# Patient Record
Sex: FEMALE | Race: WHITE | NOT HISPANIC OR LATINO | Employment: FULL TIME | URBAN - METROPOLITAN AREA
[De-identification: names, ages, dates, MRNs, and addresses within clinical notes are randomized per-mention and may not be internally consistent; named-entity substitution may affect disease eponyms.]

---

## 2017-02-17 ENCOUNTER — OFFICE VISIT (OUTPATIENT)
Dept: MIDWIFE SERVICES | Facility: CLINIC | Age: 22
End: 2017-02-17
Payer: COMMERCIAL

## 2017-02-17 ENCOUNTER — TELEPHONE (OUTPATIENT)
Dept: MIDWIFE SERVICES | Facility: CLINIC | Age: 22
End: 2017-02-17

## 2017-02-17 VITALS
HEIGHT: 64 IN | BODY MASS INDEX: 22.2 KG/M2 | WEIGHT: 130 LBS | SYSTOLIC BLOOD PRESSURE: 120 MMHG | DIASTOLIC BLOOD PRESSURE: 72 MMHG | HEART RATE: 80 BPM

## 2017-02-17 DIAGNOSIS — N92.6 MISSED PERIOD: Primary | ICD-10-CM

## 2017-02-17 DIAGNOSIS — Z30.430 VISIT FOR INSERTION OF INTRAUTERINE DEVICE: Primary | ICD-10-CM

## 2017-02-17 DIAGNOSIS — Z11.8 SCREENING FOR CHLAMYDIAL DISEASE: ICD-10-CM

## 2017-02-17 DIAGNOSIS — Z23 NEED FOR HPV VACCINE: ICD-10-CM

## 2017-02-17 DIAGNOSIS — Z11.3 SCREENING EXAMINATION FOR VENEREAL DISEASE: ICD-10-CM

## 2017-02-17 DIAGNOSIS — Z30.09 COUNSELING FOR BIRTH CONTROL REGARDING INTRAUTERINE DEVICE (IUD): ICD-10-CM

## 2017-02-17 LAB — B-HCG SERPL-ACNC: <1 IU/L

## 2017-02-17 PROCEDURE — 87491 CHLMYD TRACH DNA AMP PROBE: CPT | Performed by: ADVANCED PRACTICE MIDWIFE

## 2017-02-17 PROCEDURE — 84702 CHORIONIC GONADOTROPIN TEST: CPT | Performed by: ADVANCED PRACTICE MIDWIFE

## 2017-02-17 PROCEDURE — 36415 COLL VENOUS BLD VENIPUNCTURE: CPT | Performed by: ADVANCED PRACTICE MIDWIFE

## 2017-02-17 PROCEDURE — 90471 IMMUNIZATION ADMIN: CPT | Performed by: ADVANCED PRACTICE MIDWIFE

## 2017-02-17 PROCEDURE — 87591 N.GONORRHOEAE DNA AMP PROB: CPT | Performed by: ADVANCED PRACTICE MIDWIFE

## 2017-02-17 PROCEDURE — 90651 9VHPV VACCINE 2/3 DOSE IM: CPT | Performed by: ADVANCED PRACTICE MIDWIFE

## 2017-02-17 PROCEDURE — 58300 INSERT INTRAUTERINE DEVICE: CPT | Performed by: ADVANCED PRACTICE MIDWIFE

## 2017-02-17 RX ORDER — MISOPROSTOL 200 UG/1
200 TABLET ORAL EVERY 12 HOURS
Qty: 2 TABLET | Refills: 0 | Status: SHIPPED | OUTPATIENT
Start: 2017-02-17 | End: 2017-02-21

## 2017-02-17 ASSESSMENT — ANXIETY QUESTIONNAIRES
7. FEELING AFRAID AS IF SOMETHING AWFUL MIGHT HAPPEN: NOT AT ALL
2. NOT BEING ABLE TO STOP OR CONTROL WORRYING: SEVERAL DAYS
GAD7 TOTAL SCORE: 4
6. BECOMING EASILY ANNOYED OR IRRITABLE: SEVERAL DAYS
IF YOU CHECKED OFF ANY PROBLEMS ON THIS QUESTIONNAIRE, HOW DIFFICULT HAVE THESE PROBLEMS MADE IT FOR YOU TO DO YOUR WORK, TAKE CARE OF THINGS AT HOME, OR GET ALONG WITH OTHER PEOPLE: SOMEWHAT DIFFICULT
5. BEING SO RESTLESS THAT IT IS HARD TO SIT STILL: NOT AT ALL
1. FEELING NERVOUS, ANXIOUS, OR ON EDGE: SEVERAL DAYS
3. WORRYING TOO MUCH ABOUT DIFFERENT THINGS: SEVERAL DAYS

## 2017-02-17 ASSESSMENT — PATIENT HEALTH QUESTIONNAIRE - PHQ9: 5. POOR APPETITE OR OVEREATING: NOT AT ALL

## 2017-02-17 NOTE — PROGRESS NOTES
"    SUBJECTIVE:                                                   Analy Hastings is a 21 year old female who presents to clinic today for the following health issue(s):  No chief complaint on file.      Additional information: ***    HPI:  ***    No LMP recorded..   Patient {is/is not:501279::\"is\"} sexually active, .  Using {[facility] CONTRACEPTION:886758} for contraception.    reports that she has never smoked. She has never used smokeless tobacco.  {Tobacco Cessation -- Delete if patient is a non-smoker:637909}  STD testing offered?  {[facility] GC/CHLAMYDIA:966636}    Health maintenance updated:  {yes no:063554}    Today's PHQ-2 Score:   PHQ-2 (  [employer]) 2015   Q1: Little interest or pleasure in doing things 0   Q2: Feeling down, depressed or hopeless 0   PHQ-2 Score 0     Today's PHQ-9 Score: No flowsheet data found.  Today's ARLEN-7 Score: No flowsheet data found.    Problem list and histories reviewed & adjusted, as indicated.  Additional history: as documented.    Patient Active Problem List   Diagnosis     CARDIOVASCULAR SCREENING; LDL GOAL LESS THAN 160     Past Surgical History   Procedure Laterality Date     Hc tooth extraction w/forcep        Social History   Substance Use Topics     Smoking status: Never Smoker     Smokeless tobacco: Never Used     Alcohol use 0.0 oz/week     0 Standard drinks or equivalent per week      Comment: socially      Problem (# of Occurrences) Relation (Name,Age of Onset)    HEART DISEASE (1) Maternal Grandfather    Rheumatoid Arthritis (1) Mother            Current Outpatient Prescriptions   Medication Sig     levonorgestrel-ethinyl estradiol (AVIANE,ALESSE,LESSINA) 0.1-20 MG-MCG per tablet Take 1 tablet by mouth daily     clindamycin (CLINDAMAX) 1 % gel Apply topically every morning     tretinoin (RETIN-A) 0.025 % cream Apply topically At Bedtime     norgestim-eth estrad triphasic (TRI-SPRINTEC) 0.18/0.215/0.25 MG-35 MCG per tablet Take 1 tablet by mouth daily     No " "current facility-administered medications for this visit.      No Known Allergies    ROS:  {[facility] ROSGYN:017311::\"12 point review of systems negative other than symptoms noted below.\"}    OBJECTIVE:     There were no vitals taken for this visit.  There is no height or weight on file to calculate BMI.    Exam:  {[facility] EXAM CHOICES:746703}     In-Clinic Test Results:  No results found for this or any previous visit (from the past 24 hour(s)).    ASSESSMENT/PLAN:                                                      No diagnosis found.    There are no Patient Instructions on file for this visit.    ***    [facility]  "

## 2017-02-17 NOTE — PROGRESS NOTES
"  SUBJECTIVE:                                                   Analy Hastings is a 21 year old who presents to clinic today for the following health issue(s):  Patient presents with:  IUD: Discuss, using condoms, 6 days late on period  * 2 UPT's both negative    HPI: Analy presents today for evaluation of a missed period.  .  She states that her periods are usually every 28-30 days.  Her LMP was 17. She had some \"light bleeding\" around  and has had no bleeding since.  She expected her period to start on or around 17.  Analy states that she is stressed out from her role as a  but denies any sudden increases in stress/anxiety.  She has not had any major changes in her diet.  Analy did join a gym in January and has been working out 4x per week; she was not working out previously. Analy is sexually active, no new partners, and she uses condoms each time she has intercourse.  She is not concerned about STIs.  Analy took two pregnancy tests at home on Wednesday 2/15/17 and both were negative.  Denies any pregnancy symptoms; states she did have worsening of her acne this week, which usually happens before her period. Would like to have a serum HCG drawn.    Analy desires to have an IUD placed if her pregnancy test today is negative.  She had previously been on OCPs since jatinder high but was having trouble remembering to take them, so she discontinued them a few months ago.  Risks, benefits and alternatives of IUDs discussed at length, including pregnancy, ectopic pregnancy, PID, life threatening infection, perforation, expulsion, cramping, changes in bleeding and ovarian cysts.  The probable mechanisms of action were covered, failure rates, spontaneous expulsion, the importance of checking the string monthly, as well as minor or nuisance side effects such as ovarian cysts, migraines, skin changes, irregular and unpredictable bleeding in the first 6 months of " "use and bleeding patterns after the first 6 months. Analy denies having any distortion of the uterine cavity, active pelvic infections, unexplained uterine bleeding, breast cancer, liver disease.         Patient's last menstrual period was 2017.  Menstrual History: frequency: every 28-30 days  Patient is sexually active  .  Using condoms for contraception.   Health maintenance updated:  yes  STI infx testing offered:  Declined    Last PHQ-9 score on record =   PHQ-9 SCORE 2017   Total Score 2     Last GAD7 score on record =   ARLEN-7 SCORE 2017   Total Score 4     Alcohol Score = 4    Problem list and histories reviewed & adjusted, as indicated.  Additional history: as documented.    Patient Active Problem List   Diagnosis     CARDIOVASCULAR SCREENING; LDL GOAL LESS THAN 160     Past Surgical History   Procedure Laterality Date     Hc tooth extraction w/forcep        Social History   Substance Use Topics     Smoking status: Never Smoker     Smokeless tobacco: Never Used     Alcohol use 0.0 oz/week     0 Standard drinks or equivalent per week      Comment: socially      Problem (# of Occurrences) Relation (Name,Age of Onset)    HEART DISEASE (1) Maternal Grandfather    Rheumatoid Arthritis (1) Mother            Current Outpatient Prescriptions   Medication Sig     clindamycin (CLINDAMAX) 1 % gel Apply topically every morning     tretinoin (RETIN-A) 0.025 % cream Apply topically At Bedtime     No current facility-administered medications for this visit.      No Known Allergies    ROS:  12 point review of systems negative other than symptoms noted below.  Genitourinary: No Periods  Skin: Acne    OBJECTIVE:     /72  Pulse 80  Ht 5' 4\" (1.626 m)  Wt 130 lb (59 kg)  LMP 2017  Breastfeeding? No  BMI 22.31 kg/m2  Body mass index is 22.31 kg/(m^2).    PHYSICAL EXAM:  Constitutional:  Appearance: Well nourished, well developed alert, in no acute distress  Chest:  Respiratory Effort:  " Breathing unlabored  Neurologic/Psychiatric:  Mental Status:  Oriented X3   Cervix: no lesions, no discharge.  GC/CT collected. Negative cervical motion tenderness.  Ovaries: no masses appreciated  Uterus: retroverted, smooth contour, without enlargement, mobile and without tenderness  Rectal:  deferred      In-Clinic Test Results:  No results found for this or any previous visit (from the past 24 hour(s)).    ASSESSMENT/PLAN:                                                        ICD-10-CM    1. Missed period N92.6 HCG quantitative pregnancy   2. Counseling for birth control regarding intrauterine device (IUD) Z30.09    3. Screening examination for venereal disease Z11.3 Neisseria gonorrhoeae PCR   4. Screening for chlamydial disease Z11.8 Chlamydia trachomatis PCR   5. Need for HPV vaccine Z23 HUMAN PAPILLOMA VIRUS (GARDASIL 9) VACCINE     ADMIN 1st VACCINE       COUNSELING:  -HCG quant, GC/CT ordered  -Third dose of Gardasil vaccine administered  -Will call with HCG quant, GC/CT results as they become available  -Pamphlets for Mirena, Jacqueline and Paragard provided to Analy  -May schedule an IUD insertion if HCG quant is negative; repeat pregnancy test day of placement. Consider cytotec administration prior to placement.        Mihaela Soler, DNP, APRN, CNM    40 minutes was spent face to face with the patient today discussing her history, diagnosis, and follow-up plan as noted above. Over 50% of the visit was spent in counseling and coordination of care.    Total Visit Time: 40 minutes.

## 2017-02-17 NOTE — PROGRESS NOTES
I spoke with Analy via telephone call and communicated these results.  See telephone encounter.  Analy voiced understanding and denied further questions or concerns.    Mihaela Soler, DNP, APRN, CNM

## 2017-02-17 NOTE — TELEPHONE ENCOUNTER
Call placed to Analy regarding her lab results.  HCG quant result is <1.  She desires to have a Mirena placed.  Because she is unsure when her period will come next, she would like to schedule IUD placement for next week and use misoprostol rather than wait for her period.  Rx sent to preferred pharmacy.  Advised Analy to abstain from intercourse until after placement. Discussed seeking care if she experiences heavy bleeding or severe pain with misoprostol use.  Analy is comfortable with the plan of care and denies further questions or concerns. Transferred to scheduling to make IUD insertion appointment.     Mihaela Soler, DNP, APRN, CNM

## 2017-02-17 NOTE — MR AVS SNAPSHOT
"              After Visit Summary   2/17/2017    Analy Hastings    MRN: [id number]           Patient Information     Date Of Birth          1995        Visit Information        Provider Department      2/17/2017 11:00 AM Mihaela Soler APRN CNM [facility]        Today's Diagnoses     Missed period    -  1    Screening examination for venereal disease        Screening for chlamydial disease           Follow-ups after your visit        Who to contact     If you have questions or need follow up information about today's clinic visit or your schedule please contact [facility] directly at [phone].  Normal or non-critical lab and imaging results will be communicated to you by [employer], letter or phone within 4 business days after the clinic has received the results. If you do not hear from us within 7 days, please contact the clinic through [employer] or phone. If you have a critical or abnormal lab result, we will notify you by phone as soon as possible.  Submit refill requests through [employer] or call your pharmacy and they will forward the refill request to us. Please allow 3 business days for your refill to be completed.          Additional Information About Your Visit        MyChart Information     [employer] gives you secure access to your electronic health record. If you see a primary care provider, you can also send messages to your care team and make appointments. If you have questions, please call your primary care clinic.  If you do not have a primary care provider, please call [phone] and they will assist you.        Care EveryWhere ID     This is your Care EveryWhere ID. This could be used by other organizations to access your [place] medical records  [id number]        Your Vitals Were     Pulse Height Last Period Breastfeeding? BMI (Body Mass Index)       80 5' 4\" (1.626 m) 01/13/2017 No 22.31 kg/m2        Blood Pressure from Last 3 Encounters: "   02/17/17 120/72   08/24/16 116/58   09/23/15 118/70    Weight from Last 3 Encounters:   02/17/17 130 lb (59 kg)   08/24/16 130 lb 3.2 oz (59.1 kg)   09/23/15 121 lb 4.8 oz (55 kg)              We Performed the Following     Chlamydia trachomatis PCR     HCG quantitative pregnancy     Neisseria gonorrhoeae PCR        Primary Care Provider    None Specified       No primary provider on file.        Thank you!     Thank you for choosing [facility] [place]  for your care. Our goal is always to provide you with excellent care. Hearing back from our patients is one way we can continue to improve our services. Please take a few minutes to complete the written survey that you may receive in the mail after your visit with us. Thank you!             Your Updated Medication List - Protect others around you: Learn how to safely use, store and throw away your medicines at www.disposemymeds.org.          This list is accurate as of: 2/17/17 12:09 PM.  Always use your most recent med list.                   Brand Name Dispense Instructions for use    clindamycin 1 % topical gel    CLINDAMAX    30 g    Apply topically every morning       tretinoin 0.025 % cream    RETIN-A    20 g    Apply topically At Bedtime

## 2017-02-18 ASSESSMENT — ANXIETY QUESTIONNAIRES: GAD7 TOTAL SCORE: 4

## 2017-02-18 ASSESSMENT — PATIENT HEALTH QUESTIONNAIRE - PHQ9: SUM OF ALL RESPONSES TO PHQ QUESTIONS 1-9: 2

## 2017-02-19 LAB
C TRACH DNA SPEC QL NAA+PROBE: NORMAL
N GONORRHOEA DNA SPEC QL NAA+PROBE: NORMAL
SPECIMEN SOURCE: NORMAL
SPECIMEN SOURCE: NORMAL

## 2017-02-21 ENCOUNTER — OFFICE VISIT (OUTPATIENT)
Dept: MIDWIFE SERVICES | Facility: CLINIC | Age: 22
End: 2017-02-21
Payer: COMMERCIAL

## 2017-02-21 VITALS
WEIGHT: 134 LBS | DIASTOLIC BLOOD PRESSURE: 76 MMHG | SYSTOLIC BLOOD PRESSURE: 118 MMHG | BODY MASS INDEX: 22.88 KG/M2 | HEIGHT: 64 IN | HEART RATE: 76 BPM

## 2017-02-21 DIAGNOSIS — Z30.430 ENCOUNTER FOR INTRAUTERINE DEVICE PLACEMENT: Primary | ICD-10-CM

## 2017-02-21 PROCEDURE — 58300 INSERT INTRAUTERINE DEVICE: CPT | Performed by: ADVANCED PRACTICE MIDWIFE

## 2017-02-21 NOTE — MR AVS SNAPSHOT
After Visit Summary   2/21/2017    Analy Hastings    MRN: [id number]           Patient Information     Date Of Birth          1995        Visit Information        Provider Department      2/21/2017 2:30 PM Mihaela Soler APRN CNM [facility]        Today's Diagnoses     Encounter for intrauterine device placement    -  1      Care Instructions    Your Intrauterine Device (IUD)     What to watch for right after IUD placement:      Some women may experience uterine cramps, bleeding, and/or dizziness during and right after IUD placement       To help minimize the cramps, you may take ibuprofen 600 mg with food prior to your appointment. These symptoms should improve over the next 24 hours.  Mild cramping may be present for a few days after IUD placement. You may continue taking ibuprofen as directed if needed      You may experience spotting or bleeding for the first few weeks after IUD placement      Use condoms or abstain from sex for 7 days after the insertion of your Mirena or Jacqueline IUD      If you experience fever, abdominal pain, worsening pelvic pain, dizziness, unusually heavy vaginal bleeding, suspected expulsion of device or four smelling vaginal discharge please come to the clinic for evaluation      Please schedule an appointment at the clinic for a string check 4-6 weeks after IUD placement    Your periods may change (Jacqueline/Mirena):      For the first 3 to 6 months, your monthly period may become irregular. You may also have frequent spotting or light bleeding. A few women have heavy bleeding during this time. After your body adjusts, the number of bleeding days is likely to decrease (but may remain irregular), and you may even find that your periods stop altogether for as long as Jacqueline/Mirena is in place.  Your periods will return rapidly once the IUD is removed.      ParaGard IUD users:      ParaGard IUD users may experience heavier than normal cycles  while their IUD is in place, this is considered normal     Back-up contraception is not needed                Checking for your strings:      We encourage everyone with an IUD in place to check for their strings monthly      You may check your own IUD strings by inserting a finger into the vagina and feeling the strings as they exit the cervix.  The strings will initially feel firm, like fishing line, but will soften over a few weeks.  After the strings have softened, you or your partner should not be able to feel the strings during intercourse.       If the string length greatly changes or if you cannot feel your strings at all please make an appointment to see you midwife and use a backup method of contraception like a condom.      If you can feel something hard/plastic like the IUD may not be in the correct place. You should then see your healthcare provider to have the position confirmed with ultrasound.       Remember:    IUD's do not protect against HIV or STIs.  IUD's do not prevent the formation of ovarian cysts.  IUD's do not typically reduce acne or cause weight gain or mood changes.    For more information:  [email or web address]      If you have questions or concerns please call:    [facility]   [phone]          Follow-ups after your visit        Your next 10 appointments already scheduled     Apr 04, 2017  9:00 AM CDT   Office Visit with STAR Dove CNM   [facility] ([facility])    [address] [place] [id number] [phone]           Bring a current list of meds and any records pertaining to this visit.  For Physicals, please bring immunization records and any forms needing to be filled out.  Please arrive 10 minutes early to complete paperwork.              Who to contact     If you have questions or need follow up information about today's clinic visit or your schedule please contact [place]  [facility] [facility] [place] directly at [phone].  Normal or non-critical lab and imaging results will be communicated to you by MyChart, letter or phone within 4 business days after the clinic has received the results. If you do not hear from us within 7 days, please contact the clinic through [email or web address] or phone. If you have a critical or abnormal lab result, we will notify you by phone as soon as possible.  Submit refill requests through [employer] or call your pharmacy and they will forward the refill request to us. Please allow 3 business days for your refill to be completed.          Additional Information About Your Visit        Shippable[employer]Reissued Information     [employer] gives you secure access to your electronic health record. If you see a primary care provider, you can also send messages to your care team and make appointments. If you have questions, please call your primary care clinic.  If you do not have a primary care provider, please call [phone] and they will assist you.        Care EveryWhere ID     This is your Care EveryWhere ID. This could be used by other organizations to access your [place] medical records  [id number]        Your Vitals Were     Pulse Height Last Period BMI (Body Mass Index)          76 5' 4\" (1.626 m) 01/13/2017 23 kg/m2         Blood Pressure from Last 3 Encounters:   02/21/17 118/76   02/17/17 120/72   08/24/16 116/58    Weight from Last 3 Encounters:   02/21/17 134 lb (60.8 kg)   02/17/17 130 lb (59 kg)   08/24/16 130 lb 3.2 oz (59.1 kg)              We Performed the Following     HC LEVONORGESTREL IU 52MG 5 YR     INSERTION INTRAUTERINE DEVICE          Today's Medication Changes          These changes are accurate as of: 2/21/17  3:06 PM.  If you have any questions, ask your nurse or doctor.               Start taking these medicines.        Dose/Directions    levonorgestrel 20 MCG/24HR IUD   Commonly known as:  MIRENA (52 MG)   Used for:  Encounter for intrauterine device placement "   Started by:  Mihaela Soler APRN CNM        Dose:  1 each   1 each (20 mcg) by Intrauterine route once for 1 dose   Quantity:  1 each   Refills:  0            Where to get your medicines      Some of these will need a paper prescription and others can be bought over the counter.  Ask your nurse if you have questions.     You don't need a prescription for these medications     levonorgestrel 20 MCG/24HR IUD                Primary Care Provider    None Specified       No primary provider on file.        Thank you!     Thank you for choosing [facility] [place]  for your care. Our goal is always to provide you with excellent care. Hearing back from our patients is one way we can continue to improve our services. Please take a few minutes to complete the written survey that you may receive in the mail after your visit with us. Thank you!             Your Updated Medication List - Protect others around you: Learn how to safely use, store and throw away your medicines at www.disposemymeds.org.          This list is accurate as of: 2/21/17  3:06 PM.  Always use your most recent med list.                   Brand Name Dispense Instructions for use    clindamycin 1 % topical gel    CLINDAMAX    30 g    Apply topically every morning       levonorgestrel 20 MCG/24HR IUD    MIRENA (52 MG)    1 each    1 each (20 mcg) by Intrauterine route once for 1 dose       tretinoin 0.025 % cream    RETIN-A    20 g    Apply topically At Bedtime

## 2017-02-21 NOTE — PROGRESS NOTES
MIDWIFE IUD PLACEMENT NOTE    HPI:   Analy Hastings is a 21 year old female here today for IUD insertion.  Patient's last menstrual period was 01/13/2017.  Quant HCG on 2/17/17 - Negative  Patient has not premedicated with Ibuprofen   Patient did use Cytotec prior to IUD insertion  Patient does not have any infections or cervicitis. Gonorrhea and Chlamydia testing collected 2/17/17; negative.  Patient does not have history of liver problems or cancer.     Patient has been given written information.  I have reviewed the risks of the IUD including pregnancy, PID, life threatening infection, perforation, expulsion, cramping, changes in bleeding and ovarian cysts. Benefits of the IUD and alternative family planning methods have been discussed.  The probable mechanisms of action were covered, failure rates, spontaneous expulsion, the importance of checking the string monthly, as well as minor or nuisance side effects such as ovarian cysts, migraines, skin changes irregular and unpredictable bleeding in the first 6 months of use and bleeding patterns after the first 6 months.  The 's printed material was provided for her review.  Patients questions are answered.  Patient has verbalized understanding of risks and benefits and has signed the consent form.    Health maintenance updated:  yes    No Known Allergies  Current Outpatient Prescriptions   Medication Sig Dispense Refill     levonorgestrel (MIRENA, 52 MG,) 20 MCG/24HR IUD 1 each (20 mcg) by Intrauterine route once for 1 dose 1 each 0     clindamycin (CLINDAMAX) 1 % gel Apply topically every morning 30 g 11     tretinoin (RETIN-A) 0.025 % cream Apply topically At Bedtime 20 g 3      Past Medical History   Diagnosis Date     Migraine      Family History   Problem Relation Age of Onset     Rheumatoid Arthritis Mother      HEART DISEASE Maternal Grandfather      Social History     Social History     Marital status: Single     Spouse name: N/A     Number of  "children: 0     Years of education: N/A     Occupational History      Student     elementary education     Social History Main Topics     Smoking status: Never Smoker     Smokeless tobacco: Never Used     Alcohol use 0.0 oz/week     0 Standard drinks or equivalent per week      Comment: socially     Drug use: No     Sexual activity: Yes     Partners: Male     Birth control/ protection: None     Other Topics Concern     Not on file     Social History Narrative     Past Surgical History   Procedure Laterality Date     Hc tooth extraction w/forcep         REVIEW OF SYSTEMS:  12 point review of systems negative other than symptoms noted below.      EXAM:  /76  Pulse 76  Ht 5' 4\" (1.626 m)  Wt 134 lb (60.8 kg)  LMP 01/13/2017  BMI 23 kg/m2    PELVIC EXAM:  Vulva: No external lesions, BUS WNL  Vagina: Moist, pink, discharge normal  well rugated, no lesions   Cervix: smooth, pink, no visible lesions, neg CMT  Uterus: Normal size, mid-position, non-tender, mobile  Ovaries: No mass, non-tender  Rectal exam: deferred    IUD type: Mirena       ASSESSMENT/ PLAN:  Encounter for intrauterine device placement    Procedure:  Uterus assessed for position and is mid-position.  Speculum inserted.  Betadine prep of cervix done.  Tenaculum applied at 10 and 2 o'clock.  Uterine sounded to 6.25cm's.  Cervical dilators not used.   IUD inserted in the usual fashion without difficulty.  Tenaculum removed with scant bleeding from the cervix.  Strings trimmed to 2.5 cm's.  Patient tolerated the procedure well.       COUNSELING:  Verbal and written instructions given to patient regarding checking IUD strings, warning signs.    Reviewed warning signs of fever, sharp/severe abdominal pain, reassured it can be normal to have menstrual like cramping after placement and spotting/light bleeding may last a few weeks after placement.    Reviewed with patient that the IUD needs to be replaced in 5 years, nothing in vagina for 7 days (including " intercourse) following placement of Mirena or Jacqueline IUD's.    Reviewed that IUD does not protect against STI.    Follow up appointment in 4 to 12 weeks for IUD/string check.      Mihaela Soler, DESTINEE, APRN, CNM

## 2017-02-21 NOTE — PATIENT INSTRUCTIONS
Your Intrauterine Device (IUD)     What to watch for right after IUD placement:      Some women may experience uterine cramps, bleeding, and/or dizziness during and right after IUD placement       To help minimize the cramps, you may take ibuprofen 600 mg with food prior to your appointment. These symptoms should improve over the next 24 hours.  Mild cramping may be present for a few days after IUD placement. You may continue taking ibuprofen as directed if needed      You may experience spotting or bleeding for the first few weeks after IUD placement      Use condoms or abstain from sex for 7 days after the insertion of your Mirena or Jacqueline IUD      If you experience fever, abdominal pain, worsening pelvic pain, dizziness, unusually heavy vaginal bleeding, suspected expulsion of device or four smelling vaginal discharge please come to the clinic for evaluation      Please schedule an appointment at the clinic for a string check 4-6 weeks after IUD placement    Your periods may change (Jacqueline/Mirena):      For the first 3 to 6 months, your monthly period may become irregular. You may also have frequent spotting or light bleeding. A few women have heavy bleeding during this time. After your body adjusts, the number of bleeding days is likely to decrease (but may remain irregular), and you may even find that your periods stop altogether for as long as Jacqueline/Mirena is in place.  Your periods will return rapidly once the IUD is removed.      ParaGard IUD users:      ParaGard IUD users may experience heavier than normal cycles while their IUD is in place, this is considered normal     Back-up contraception is not needed                Checking for your strings:      We encourage everyone with an IUD in place to check for their strings monthly      You may check your own IUD strings by inserting a finger into the vagina and feeling the strings as they exit the cervix.  The strings will initially feel firm, like fishing  line, but will soften over a few weeks.  After the strings have softened, you or your partner should not be able to feel the strings during intercourse.       If the string length greatly changes or if you cannot feel your strings at all please make an appointment to see you midwife and use a backup method of contraception like a condom.      If you can feel something hard/plastic like the IUD may not be in the correct place. You should then see your healthcare provider to have the position confirmed with ultrasound.       Remember:    IUD's do not protect against HIV or STIs.  IUD's do not prevent the formation of ovarian cysts.  IUD's do not typically reduce acne or cause weight gain or mood changes.    For more information:  [email or web address]/      If you have questions or concerns please call:    [facility]   [phone]

## 2017-03-01 ENCOUNTER — MYC MEDICAL ADVICE (OUTPATIENT)
Dept: MIDWIFE SERVICES | Facility: CLINIC | Age: 22
End: 2017-03-01

## 2017-03-02 ENCOUNTER — TELEPHONE (OUTPATIENT)
Dept: NURSING | Facility: CLINIC | Age: 22
End: 2017-03-02

## 2017-03-02 NOTE — TELEPHONE ENCOUNTER
LMTCB (PHI on consent) to discuss as we prefer to talk on the phone versus MyChart about bleeding.

## 2017-03-02 NOTE — TELEPHONE ENCOUNTER
"Call Type: Triage Call    Presenting Problem: Had an IUD placed on the 22nd.  Had menstrual  period for about 4 days. Continue to bleed although it is getting  less each day. It is now 8 days. She is wearing a panty liner and  her flow is described now as 1/2 of what it was 2 days ago - fairly  light but more than just \"spotting\".  It is getting less each day.  She has no fever and does not feel ill.  She states she had a  negative pregnancy test prior to insertion.  She will call the  clinic tomorrow to talk to a nurse who will have access to the MD's  and together will decide if she needs to be seen or if she should  get through the weekend and see if she is still bleeding Monday.  Triage Note:  Guideline Title: Contraception: Intrauterine Device (IUD)  Recommended Disposition: See Provider within 72 Hours  Original Inclination: Wanted to speak with a nurse  Override Disposition:  Intended Action: Follow advice given  Physician Contacted: No  Mild vaginal bleeding or spotting ?  YES  Moderate vaginal bleeding ? NO  Sexually active AND ectopic pregnancy risk factors ? NO  Able to feel hard plastic of IUD (not the string) ? NO  New or worsening signs and symptoms that may indicate shock ? NO  Moderate abdominal or pelvic pain ? NO  Unbearable abdominal/pelvic pain or cramping ? NO  Profuse vaginal bleeding not contained by pads or tampons ? NO  Heavy vaginal bleeding (soaking 1 pad/tampon every hour for 2 hours or more) ? NO  Pelvic pain AND foul smelling or unusual vaginal discharge ? NO  Foul smelling or unusual vaginal discharge with no other symptoms ? NO  Pregnant AND IUD related symptoms ? NO  IUD inserted within last 48 hours AND having persistent pain when following  provider pain relief instructions ? NO  IUD inserted within last 48 hours AND pain relieved by nonprescription  anti-inflammatory medications, but returns ? NO  Generalized or localized pain that becomes severe with movement, standing " up  straight or coughing ? NO  Physician Instructions:  Care Advice: Refrain from douching, using feminine hygiene sprays, scented  deodorant tampons, or nonprescription intravaginal medication until  evaluated by a provider.  IUD Symptoms:  Any of the following require an evaluation by provider.   P  - Period late (possible pregnancy), abnormal spotting or bleeding.   A -  Abnormal pain, pain with intercourse, pelvic pain or severe menstrual  cramping.   I - Infection exposure, abnormal vaginal discharge, partner  diagnosed with sexually transmitted disease (STD).   N - Not feeling well  chills, fever.   S - String, missing or becoming shorter/longer.

## 2017-03-08 NOTE — TELEPHONE ENCOUNTER
"Called pt and she stated the bleeding is pretty much done but has discharge with a pink tint to it occasionally, no odor to the discharge. Cramping pt states \"has never been debilitating,\" occurs about once a day will have some moderate cramping. Hasn't taken Ibuprofen for it but if she take it the cramping goes away. Informed her this all sounds normal. Never had children before and informed her with that she can have some cramping or twinges. Informed to monitor things and if still not better to let us know. Also if starts bleeding, passing large clots, intense pain to let us know. Pt verbalized understanding of information/instructions.        Closing encounter.        "

## 2017-04-04 ENCOUNTER — OFFICE VISIT (OUTPATIENT)
Dept: MIDWIFE SERVICES | Facility: CLINIC | Age: 22
End: 2017-04-04
Payer: COMMERCIAL

## 2017-04-04 VITALS
DIASTOLIC BLOOD PRESSURE: 70 MMHG | BODY MASS INDEX: 22.23 KG/M2 | WEIGHT: 129.5 LBS | SYSTOLIC BLOOD PRESSURE: 116 MMHG | HEART RATE: 64 BPM

## 2017-04-04 DIAGNOSIS — Z30.431 INTRAUTERINE DEVICE SURVEILLANCE: Primary | ICD-10-CM

## 2017-04-04 PROCEDURE — 99212 OFFICE O/P EST SF 10 MIN: CPT | Performed by: ADVANCED PRACTICE MIDWIFE

## 2017-04-04 NOTE — PATIENT INSTRUCTIONS
Your Intrauterine Device (IUD)     What to watch for right after IUD placement:      Some women may experience uterine cramps, bleeding, and/or dizziness during and right after IUD placement       To help minimize the cramps, you may take ibuprofen 600 mg with food prior to your appointment. These symptoms should improve over the next 24 hours.  Mild cramping may be present for a few days after IUD placement. You may continue taking ibuprofen as directed if needed      You may experience spotting or bleeding for the first few weeks after IUD placement      Use condoms or abstain from sex for 7 days after the insertion of your Mirena or Jacqueline IUD      If you experience fever, abdominal pain, worsening pelvic pain, dizziness, unusually heavy vaginal bleeding, suspected expulsion of device or four smelling vaginal discharge please come to the clinic for evaluation      Please schedule an appointment at the clinic for a string check 4-6 weeks after IUD placement    Your periods may change (Jacqueline/Mirena):      For the first 3 to 6 months, your monthly period may become irregular. You may also have frequent spotting or light bleeding. A few women have heavy bleeding during this time. After your body adjusts, the number of bleeding days is likely to decrease (but may remain irregular), and you may even find that your periods stop altogether for as long as Jacqueline/Mirena is in place.  Your periods will return rapidly once the IUD is removed.      ParaGard IUD users:      ParaGard IUD users may experience heavier than normal cycles while their IUD is in place, this is considered normal     Back-up contraception is not needed                Checking for your strings:      We encourage everyone with an IUD in place to check for their strings monthly      You may check your own IUD strings by inserting a finger into the vagina and feeling the strings as they exit the cervix.  The strings will initially feel firm, like fishing  line, but will soften over a few weeks.  After the strings have softened, you or your partner should not be able to feel the strings during intercourse.       If the string length greatly changes or if you cannot feel your strings at all please make an appointment to see you midwife and use a backup method of contraception like a condom.      If you can feel something hard/plastic like the IUD may not be in the correct place. You should then see your healthcare provider to have the position confirmed with ultrasound.       Remember:    IUD's do not protect against HIV or STIs.  IUD's do not prevent the formation of ovarian cysts.  IUD's do not typically reduce acne or cause weight gain or mood changes.    For more information:  [email or web address]/      If you have questions or concerns please call:    [facility]   [phone]

## 2017-04-04 NOTE — MR AVS SNAPSHOT
After Visit Summary   4/4/2017    Analy Hastings    MRN: [id number]           Patient Information     Date Of Birth          1995        Visit Information        Provider Department      4/4/2017 9:00 AM Mihaela Soler APRN CNM [facility]        Today's Diagnoses     Intrauterine device surveillance    -  1      Care Instructions    Your Intrauterine Device (IUD)     What to watch for right after IUD placement:      Some women may experience uterine cramps, bleeding, and/or dizziness during and right after IUD placement       To help minimize the cramps, you may take ibuprofen 600 mg with food prior to your appointment. These symptoms should improve over the next 24 hours.  Mild cramping may be present for a few days after IUD placement. You may continue taking ibuprofen as directed if needed      You may experience spotting or bleeding for the first few weeks after IUD placement      Use condoms or abstain from sex for 7 days after the insertion of your Mirena or Jacqueline IUD      If you experience fever, abdominal pain, worsening pelvic pain, dizziness, unusually heavy vaginal bleeding, suspected expulsion of device or four smelling vaginal discharge please come to the clinic for evaluation      Please schedule an appointment at the clinic for a string check 4-6 weeks after IUD placement    Your periods may change (Jacqueline/Mirena):      For the first 3 to 6 months, your monthly period may become irregular. You may also have frequent spotting or light bleeding. A few women have heavy bleeding during this time. After your body adjusts, the number of bleeding days is likely to decrease (but may remain irregular), and you may even find that your periods stop altogether for as long as Jacqueline/Mirena is in place.  Your periods will return rapidly once the IUD is removed.      ParaGard IUD users:      ParaGard IUD users may experience heavier than normal cycles while their IUD  is in place, this is considered normal     Back-up contraception is not needed                Checking for your strings:      We encourage everyone with an IUD in place to check for their strings monthly      You may check your own IUD strings by inserting a finger into the vagina and feeling the strings as they exit the cervix.  The strings will initially feel firm, like fishing line, but will soften over a few weeks.  After the strings have softened, you or your partner should not be able to feel the strings during intercourse.       If the string length greatly changes or if you cannot feel your strings at all please make an appointment to see you midwife and use a backup method of contraception like a condom.      If you can feel something hard/plastic like the IUD may not be in the correct place. You should then see your healthcare provider to have the position confirmed with ultrasound.       Remember:    IUD's do not protect against HIV or STIs.  IUD's do not prevent the formation of ovarian cysts.  IUD's do not typically reduce acne or cause weight gain or mood changes.    For more information:  [email or web address]      If you have questions or concerns please call:    [facility]   [phone]            Follow-ups after your visit        Follow-up notes from your care team     Return if symptoms worsen or fail to improve, for Routine Visit.      Who to contact     If you have questions or need follow up information about today's clinic visit or your schedule please contact [facility] directly at [phone].  Normal or non-critical lab and imaging results will be communicated to you by MyChart, letter or phone within 4 business days after the clinic has received the results. If you do not hear from us within 7 days, please contact the clinic through MyChart or phone. If you have a critical or abnormal lab result, we will notify you by phone as soon as  possible.  Submit refill requests through [employer] or call your pharmacy and they will forward the refill request to us. Please allow 3 business days for your refill to be completed.          Additional Information About Your Visit        "[employer] Information     [employer] gives you secure access to your electronic health record. If you see a primary care provider, you can also send messages to your care team and make appointments. If you have questions, please call your primary care clinic.  If you do not have a primary care provider, please call [phone] and they will assist you.        Care EveryWhere ID     This is your Care EveryWhere ID. This could be used by other organizations to access your [place] medical records  [id number]        Your Vitals Were     Pulse Last Period BMI (Body Mass Index)             64 03/19/2017 (Exact Date) 22.23 kg/m2          Blood Pressure from Last 3 Encounters:   04/04/17 116/70   02/21/17 118/76   02/17/17 120/72    Weight from Last 3 Encounters:   04/04/17 129 lb 8 oz (58.7 kg)   02/21/17 134 lb (60.8 kg)   02/17/17 130 lb (59 kg)              Today, you had the following     No orders found for display       Primary Care Provider    None Specified       No primary provider on file.        Thank you!     Thank you for choosing [facility]  for your care. Our goal is always to provide you with excellent care. Hearing back from our patients is one way we can continue to improve our services. Please take a few minutes to complete the written survey that you may receive in the mail after your visit with us. Thank you!             Your Updated Medication List - Protect others around you: Learn how to safely use, store and throw away your medicines at www.disposemymeds.org.          This list is accurate as of: 4/4/17  9:26 AM.  Always use your most recent med list.                   Brand Name Dispense Instructions for use    clindamycin 1 % topical gel     CLINDAMAX    30 g    Apply topically every morning       levonorgestrel 20 MCG/24HR IUD    MIRENA (52 MG)    1 each    1 each (20 mcg) by Intrauterine route once for 1 dose       tretinoin 0.025 % cream    RETIN-A    20 g    Apply topically At Bedtime

## 2017-04-04 NOTE — PROGRESS NOTES
S: Analy Hastings is a 21 year old  here for IUD check.  She denies problems with the IUD. Analy reports that she had cramps and irregular bleeding for a month after placement. Had a period at the end of the first month after placement, no bleeding since.  She has attempted to check for the strings and is comfortable feeling them.    O: /70  Pulse 64  Wt 129 lb 8 oz (58.7 kg)  LMP 03/19/2017 (Exact Date)  BMI 22.23 kg/m2     PELVIC EXAM:  Vulva: No external lesions, normal hair distribution, no adenopathy, BUS WNL  Vagina: Moist, pink, no abnormal discharge, well rugated, no lesions  Cervix: smooth, pink, no visible lesions, ectropion present.  IUD strings visualized and are extruding approximately 2 cm from cervical os        ASSESSMENT:  1) IUD normally placed.     PLAN:  IUD surveillance    RTC when due for annual exam or suspected problems with her IUD such as abnormal bleeding, disappearance of the strings or feeling the IUD in her cervix or with any pain with intercourse, or abnormal discharge.  If she starts having increased cramping with menses,  ibuprofen 600-800mg po TID with food can be used.  If she decides that she wants to attempt pregnancy in the future, she will schedule an appointment to have the IUD removed.   Reviewed that the IUD will need to be replaced for reasons of effectiveness in 5 yrs   Analy will be due for her next pap smear in 9/2018; recommended she return for an annual visit each year.       Mihaela Soler, DESTINEE, APRN, CNM

## 2017-06-06 ENCOUNTER — OFFICE VISIT (OUTPATIENT)
Dept: OBGYN | Facility: CLINIC | Age: 22
End: 2017-06-06
Payer: COMMERCIAL

## 2017-06-06 VITALS
SYSTOLIC BLOOD PRESSURE: 120 MMHG | BODY MASS INDEX: 22.71 KG/M2 | HEIGHT: 64 IN | DIASTOLIC BLOOD PRESSURE: 64 MMHG | TEMPERATURE: 98 F | WEIGHT: 133 LBS

## 2017-06-06 DIAGNOSIS — J02.0 ACUTE STREPTOCOCCAL PHARYNGITIS: Primary | ICD-10-CM

## 2017-06-06 LAB
DEPRECATED S PYO AG THROAT QL EIA: ABNORMAL
MICRO REPORT STATUS: ABNORMAL
SPECIMEN SOURCE: ABNORMAL

## 2017-06-06 PROCEDURE — 99212 OFFICE O/P EST SF 10 MIN: CPT | Performed by: NURSE PRACTITIONER

## 2017-06-06 PROCEDURE — 87880 STREP A ASSAY W/OPTIC: CPT | Performed by: NURSE PRACTITIONER

## 2017-06-06 RX ORDER — AMOXICILLIN 500 MG/1
500 CAPSULE ORAL 3 TIMES DAILY
Qty: 30 CAPSULE | Refills: 0 | Status: SHIPPED | OUTPATIENT
Start: 2017-06-06 | End: 2020-05-28

## 2017-06-06 NOTE — MR AVS SNAPSHOT
"              After Visit Summary   6/6/2017    Analy Hastings    MRN: [id number]           Patient Information     Date Of Birth          1995        Visit Information        Provider Department      6/6/2017 9:30 AM Jolene Hollis APRN CNP [facility]        Today's Diagnoses     Acute pharyngitis    -  1    Acute streptococcal pharyngitis           Follow-ups after your visit        Follow-up notes from your care team     Return if symptoms worsen or fail to improve.      Who to contact     If you have questions or need follow up information about today's clinic visit or your schedule please contact [facility] directly at [phone].  Normal or non-critical lab and imaging results will be communicated to you by MyChart, letter or phone within 4 business days after the clinic has received the results. If you do not hear from us within 7 days, please contact the clinic through [employer] or phone. If you have a critical or abnormal lab result, we will notify you by phone as soon as possible.  Submit refill requests through [employer] or call your pharmacy and they will forward the refill request to us. Please allow 3 business days for your refill to be completed.          Additional Information About Your Visit        MyChart Information     [employer] gives you secure access to your electronic health record. If you see a primary care provider, you can also send messages to your care team and make appointments. If you have questions, please call your primary care clinic.  If you do not have a primary care provider, please call [phone] and they will assist you.        Care EveryWhere ID     This is your Care EveryWhere ID. This could be used by other organizations to access your [place] medical records  [id number]        Your Vitals Were     Temperature Height Last Period BMI (Body Mass Index)          98  F (36.7  C) 5' 4\" (1.626 m) 05/13/2017 (Exact Date) " 22.83 kg/m2         Blood Pressure from Last 3 Encounters:   06/06/17 120/64   04/04/17 116/70   02/21/17 118/76    Weight from Last 3 Encounters:   06/06/17 133 lb (60.3 kg)   04/04/17 129 lb 8 oz (58.7 kg)   02/21/17 134 lb (60.8 kg)              We Performed the Following     Strep, Rapid Screen          Today's Medication Changes          These changes are accurate as of: 6/6/17 10:06 AM.  If you have any questions, ask your nurse or doctor.               Start taking these medicines.        Dose/Directions    amoxicillin 500 MG capsule   Commonly known as:  AMOXIL   Used for:  Acute streptococcal pharyngitis   Started by:  Jolene Hollis APRN CNP        Dose:  500 mg   Take 1 capsule (500 mg) by mouth 3 times daily   Quantity:  30 capsule   Refills:  0            Where to get your medicines      These medications were sent to [place] [address] [place] [address]  [address] [place] [id number]     Phone:  [phone]     amoxicillin 500 MG capsule                Primary Care Provider    None Specified       No primary provider on file.        Thank you!     Thank you for choosing [facility]  for your care. Our goal is always to provide you with excellent care. Hearing back from our patients is one way we can continue to improve our services. Please take a few minutes to complete the written survey that you may receive in the mail after your visit with us. Thank you!             Your Updated Medication List - Protect others around you: Learn how to safely use, store and throw away your medicines at www.disposemymeds.org.          This list is accurate as of: 6/6/17 10:06 AM.  Always use your most recent med list.                   Brand Name Dispense Instructions for use    amoxicillin 500 MG capsule    AMOXIL    30 capsule    Take 1 capsule (500 mg) by mouth 3 times daily       clindamycin 1 % topical gel    CLINDAMAX    30 g    Apply topically every  morning       levonorgestrel 20 MCG/24HR IUD    MIRENA (52 MG)    1 each    1 each (20 mcg) by Intrauterine route once for 1 dose       tretinoin 0.025 % cream    RETIN-A    20 g    Apply topically At Bedtime

## 2017-06-06 NOTE — PROGRESS NOTES
SUBJECTIVE:                                                   Analy Hastings is a 21 year old female who presents to clinic today for the following health issue(s):  Patient presents with:  Pharyngitis: c/o possible strep- sore throat for 1 day, boyfriend and friend noticed white spots in back of throat, states she had a fever, [place]      HPI:patient c/o sore throat and low grade fever last night.  Is a  around a lot of kids.      Patient's last menstrual period was 2017 (exact date)..   Patient is sexually active, .  Using IUD for contraception.    reports that she has never smoked. She has never used smokeless tobacco.    STD testing offered?  Declined    Health maintenance updated:  yes    Today's PHQ-2 Score:   PHQ-2 (  [employer]) 2017   Q1: Little interest or pleasure in doing things 0   Q2: Feeling down, depressed or hopeless 0   PHQ-2 Score 0     Today's PHQ-9 Score:   PHQ-9 SCORE 2017   Total Score 2     Today's ARLEN-7 Score:   ARLEN-7 SCORE 2017   Total Score 4       Problem list and histories reviewed & adjusted, as indicated.  Additional history: as documented.    Patient Active Problem List   Diagnosis     CARDIOVASCULAR SCREENING; LDL GOAL LESS THAN 160     Past Surgical History:   Procedure Laterality Date     HC TOOTH EXTRACTION W/FORCEP        Social History   Substance Use Topics     Smoking status: Never Smoker     Smokeless tobacco: Never Used     Alcohol use 0.0 oz/week     0 Standard drinks or equivalent per week      Comment: socially      Problem (# of Occurrences) Relation (Name,Age of Onset)    HEART DISEASE (1) Maternal Grandfather    Rheumatoid Arthritis (1) Mother            Current Outpatient Prescriptions   Medication Sig     amoxicillin (AMOXIL) 500 MG capsule Take 1 capsule (500 mg) by mouth 3 times daily     levonorgestrel (MIRENA, 52 MG,) 20 MCG/24HR IUD 1 each (20 mcg) by Intrauterine route once for 1 dose     clindamycin (CLINDAMAX) 1 %  "gel Apply topically every morning (Patient not taking: Reported on 4/4/2017)     tretinoin (RETIN-A) 0.025 % cream Apply topically At Bedtime (Patient not taking: Reported on 4/4/2017)     No current facility-administered medications for this visit.      No Known Allergies    ROS:  12 point review of systems negative other than symptoms noted below.  Constitutional: Fever  Head: Sore Throat    OBJECTIVE:     /64  Temp 98  F (36.7  C)  Ht 5' 4\" (1.626 m)  Wt 133 lb (60.3 kg)  LMP 05/13/2017 (Exact Date)  BMI 22.83 kg/m2  Body mass index is 22.83 kg/(m^2).    Exam:  Constitutional:  Appearance: Well nourished, well developed alert, in no acute distress Discussed results with patient.  Will start amoxicillin.    In-Clinic Test Results:  Results for orders placed or performed in visit on 06/06/17 (from the past 24 hour(s))   Strep, Rapid Screen   Result Value Ref Range    Specimen Description Throat     Rapid Strep A Screen (A)      POSITIVE: Group A Streptococcal antigen detected by immunoassay.    Micro Report Status FINAL 06/06/2017        ASSESSMENT/PLAN:                                                        ICD-10-CM    1. Acute pharyngitis J02.9 Strep, Rapid Screen   2. Acute streptococcal pharyngitis J02.0 amoxicillin (AMOXIL) 500 MG capsule       There are no Patient Instructions on file for this visit.    Return prn.    [facility]  "

## 2017-11-29 ENCOUNTER — MYC MEDICAL ADVICE (OUTPATIENT)
Dept: MIDWIFE SERVICES | Facility: CLINIC | Age: 22
End: 2017-11-29

## 2018-11-27 ENCOUNTER — HEALTH MAINTENANCE LETTER (OUTPATIENT)
Age: 23
End: 2018-11-27

## 2019-07-30 ENCOUNTER — OFFICE VISIT (OUTPATIENT)
Dept: OBGYN | Facility: CLINIC | Age: 24
End: 2019-07-30
Payer: COMMERCIAL

## 2019-07-30 VITALS
HEART RATE: 73 BPM | BODY MASS INDEX: 22.82 KG/M2 | HEIGHT: 65 IN | DIASTOLIC BLOOD PRESSURE: 87 MMHG | OXYGEN SATURATION: 98 % | SYSTOLIC BLOOD PRESSURE: 123 MMHG | WEIGHT: 137 LBS

## 2019-07-30 DIAGNOSIS — Z11.3 SCREEN FOR STD (SEXUALLY TRANSMITTED DISEASE): ICD-10-CM

## 2019-07-30 DIAGNOSIS — Z01.419 ENCOUNTER FOR GYNECOLOGICAL EXAMINATION WITHOUT ABNORMAL FINDING: Primary | ICD-10-CM

## 2019-07-30 PROCEDURE — 36415 COLL VENOUS BLD VENIPUNCTURE: CPT | Performed by: OBSTETRICS & GYNECOLOGY

## 2019-07-30 PROCEDURE — 87491 CHLMYD TRACH DNA AMP PROBE: CPT | Performed by: OBSTETRICS & GYNECOLOGY

## 2019-07-30 PROCEDURE — G0145 SCR C/V CYTO,THINLAYER,RESCR: HCPCS | Performed by: OBSTETRICS & GYNECOLOGY

## 2019-07-30 PROCEDURE — 99395 PREV VISIT EST AGE 18-39: CPT | Performed by: OBSTETRICS & GYNECOLOGY

## 2019-07-30 PROCEDURE — 87389 HIV-1 AG W/HIV-1&-2 AB AG IA: CPT | Performed by: OBSTETRICS & GYNECOLOGY

## 2019-07-30 PROCEDURE — 87591 N.GONORRHOEAE DNA AMP PROB: CPT | Performed by: OBSTETRICS & GYNECOLOGY

## 2019-07-30 PROCEDURE — 86780 TREPONEMA PALLIDUM: CPT | Performed by: OBSTETRICS & GYNECOLOGY

## 2019-07-30 ASSESSMENT — MIFFLIN-ST. JEOR: SCORE: 1367.31

## 2019-07-30 NOTE — PROGRESS NOTES
Analy is a 23 year old  female who presents for annual exam.     Menses are rare and variable lasting 1 days.  Menses flow: light and variable.  No LMP recorded. (Menstrual status: IUD).. Using IUD for contraception.  She is not currently considering pregnancy.  Besides routine health maintenance, she has no other health concerns today .  [facility] [employer] [place]   GYNECOLOGIC HISTORY:  Menarche:     Analy is sexually active with 1male partner(s) and is currently in monogamous relationship with boyfriend.    History sexually transmitted infections:No STD history  STI testing offered?  Accepted  GERALDO exposure: No  History of abnormal Pap smear: NO - age 21-29 PAP every 3 years recommended  Family history of breast CA: No  Family history of uterine/ovarian CA: No    Family history of colon CA: No    HEALTH MAINTENANCE:  Cholesterol: (No results found for: CHOL History of abnormal lipids: No  Mammo:  . History of abnormal Mammo: Not applicable.  Regular Self Breast Exams: Yes  Calcium/Vitamin D intake: source:  dairy, dietary supplement(s) Adequate? Yes  TSH: (  TSH   Date Value Ref Range Status   02/10/2015 0.78 0.40 - 4.00 mU/L Final     Comment:     Effective 2014, the reference range for this assay has changed to reflect   new instrumentation/methodology.      )  Pap; (  Lab Results   Component Value Date    PAP NIL 2015    )    HISTORY:  OB History    Para Term  AB Living   0 0 0 0 0 0   SAB TAB Ectopic Multiple Live Births   0 0 0 0 0     Past Medical History:   Diagnosis Date     Migraine      Past Surgical History:   Procedure Laterality Date     HC TOOTH EXTRACTION W/FORCEP       Family History   Problem Relation Age of Onset     Rheumatoid Arthritis Mother      Heart Disease Maternal Grandfather      Social History     Socioeconomic History     Marital status: Single     Spouse name: None     Number of children: 0     Years of education: None     Highest  education level: None   Occupational History     Employer: [employer]     Comment: elementary education   Social Needs     Financial resource strain: None     Food insecurity:     Worry: None     Inability: None     Transportation needs:     Medical: None     Non-medical: None   Tobacco Use     Smoking status: Never Smoker     Smokeless tobacco: Never Used   Substance and Sexual Activity     Alcohol use: Yes     Alcohol/week: 0.0 oz     Comment: socially     Drug use: No     Sexual activity: Yes     Partners: Male     Birth control/protection: IUD   Lifestyle     Physical activity:     Days per week: None     Minutes per session: None     Stress: None   Relationships     Social connections:     Talks on phone: None     Gets together: None     Attends Druze service: None     Active member of club or organization: None     Attends meetings of clubs or organizations: None     Relationship status: None     Intimate partner violence:     Fear of current or ex partner: None     Emotionally abused: None     Physically abused: None     Forced sexual activity: None   Other Topics Concern     Parent/sibling w/ CABG, MI or angioplasty before 65F 55M? Not Asked   Social History Narrative     None       Current Outpatient Medications:      amoxicillin (AMOXIL) 500 MG capsule, Take 1 capsule (500 mg) by mouth 3 times daily, Disp: 30 capsule, Rfl: 0     levonorgestrel (MIRENA, 52 MG,) 20 MCG/24HR IUD, 1 each (20 mcg) by Intrauterine route once for 1 dose, Disp: 1 each, Rfl: 0     clindamycin (CLINDAMAX) 1 % gel, Apply topically every morning (Patient not taking: Reported on 4/4/2017), Disp: 30 g, Rfl: 11     tretinoin (RETIN-A) 0.025 % cream, Apply topically At Bedtime (Patient not taking: Reported on 4/4/2017), Disp: 20 g, Rfl: 3   No Known Allergies    Past medical, surgical, social and family history were reviewed and updated in EPIC.    ROS:   C:     NEGATIVE for fever, chills, change in weight  I:       NEGATIVE for  "worrisome rashes, moles or lesions  E:     NEGATIVE for vision changes or irritation  E/M: NEGATIVE for ear, mouth and throat problems  R:     NEGATIVE for significant cough or SOB  CV:   NEGATIVE for chest pain, palpitations or peripheral edema  GI:     NEGATIVE for nausea, abdominal pain, heartburn, or change in bowel habits  :   NEGATIVE for frequency, dysuria, hematuria, vaginal discharge, or irregular bleeding  M:     NEGATIVE for significant arthralgias or myalgia  N:      NEGATIVE for weakness, dizziness or paresthesias  E:      NEGATIVE for temperature intolerance, skin/hair changes  P:      NEGATIVE for changes in mood or affect.    EXAM:  /87   Pulse 73   Ht 1.651 m (5' 5\")   Wt 62.1 kg (137 lb)   SpO2 98%   Breastfeeding? No   BMI 22.80 kg/m     BMI: Body mass index is 22.8 kg/m .  Constitutional: healthy, alert and no distress  Head: Normocephalic. No masses, lesions, tenderness or abnormalities  Neck: Neck supple. Trachea midline. No adenopathy. Thyroid symmetric, normal size.   Cardiovascular: RRR.   Respiratory: Negative.   Breast: Breasts reveal mild symmetric fibrocystic densities, but there are no dominant, discrete, fixed or suspicious masses found.  Gastrointestinal: Abdomen soft, non-tender, non-distended. No masses, organomegaly.  :  Vulva:  No external lesions, normal female hair distribution, no inguinal adenopathy.    Urethra:  Midline, non-tender, well supported, no discharge  Vagina:  Moist, pink, no abnormal discharge, no lesions  Uterus:  Normal size, anteverted , non-tender, freely mobile. IUD strings at os.  Ovaries:  No masses appreciated, non-tender, mobile  Rectal Exam: deferred  Musculoskeletal: extremities normal  Skin: no suspicious lesions or rashes  Psychiatric: Affect appropriate, cooperative,mentation appears normal.     COUNSELING:   Reviewed preventive health counseling, as reflected in patient instructions       Regular exercise       Healthy " diet/nutrition       Contraception       Safe sex practices/STD prevention   reports that she has never smoked. She has never used smokeless tobacco.    Body mass index is 22.8 kg/m .    FRAX Risk Assessment    ASSESSMENT:  23 year old female with satisfactory annual exam  (Z01.419) Encounter for gynecological examination without abnormal finding  (primary encounter diagnosis)  Comment:   Plan: Pap imaged thin layer screen reflex to HPV if         ASCUS - recommend age 25 - 29            (Z11.3) Screen for STD (sexually transmitted disease)  Comment:   Plan: NEISSERIA GONORRHOEA PCR, CHLAMYDIA TRACHOMATIS        PCR, HIV Antigen Antibody Combo, Treponema Abs         w Reflex to RPR and Titer

## 2019-07-30 NOTE — NURSING NOTE
"Chief Complaint   Patient presents with     Physical       Initial There were no vitals taken for this visit. Estimated body mass index is 22.83 kg/m  as calculated from the following:    Height as of 17: 1.626 m (5' 4\").    Weight as of 17: 60.3 kg (133 lb).  BP completed using cuff size: regular    Questioned patient about current smoking habits.  Pt. has never smoked.          The following HM Due: pap smear      The following patient reported/Care Every where data was sent to:  P ABSTRACT QUALITY INITIATIVES [[id number]]  none      patient has appointment for today              "

## 2019-07-31 LAB — HIV 1+2 AB+HIV1 P24 AG SERPL QL IA: NONREACTIVE

## 2019-08-01 LAB
C TRACH DNA SPEC QL NAA+PROBE: NEGATIVE
N GONORRHOEA DNA SPEC QL NAA+PROBE: NEGATIVE
SPECIMEN SOURCE: NORMAL
SPECIMEN SOURCE: NORMAL
T PALLIDUM AB SER QL: NONREACTIVE

## 2019-08-03 LAB
COPATH REPORT: NORMAL
PAP: NORMAL

## 2019-12-13 ENCOUNTER — OFFICE VISIT (OUTPATIENT)
Dept: FAMILY MEDICINE | Facility: CLINIC | Age: 24
End: 2019-12-13
Payer: COMMERCIAL

## 2019-12-13 ENCOUNTER — ANCILLARY PROCEDURE (OUTPATIENT)
Dept: GENERAL RADIOLOGY | Facility: CLINIC | Age: 24
End: 2019-12-13
Attending: FAMILY MEDICINE
Payer: COMMERCIAL

## 2019-12-13 VITALS
TEMPERATURE: 98.2 F | HEIGHT: 65 IN | OXYGEN SATURATION: 96 % | SYSTOLIC BLOOD PRESSURE: 119 MMHG | DIASTOLIC BLOOD PRESSURE: 77 MMHG | RESPIRATION RATE: 16 BRPM | WEIGHT: 148 LBS | HEART RATE: 76 BPM | BODY MASS INDEX: 24.66 KG/M2

## 2019-12-13 DIAGNOSIS — S76.011A STRAIN OF RIGHT HIP AND THIGH, INITIAL ENCOUNTER: ICD-10-CM

## 2019-12-13 DIAGNOSIS — S76.911A STRAIN OF RIGHT HIP AND THIGH, INITIAL ENCOUNTER: ICD-10-CM

## 2019-12-13 DIAGNOSIS — F41.9 ANXIETY: Primary | ICD-10-CM

## 2019-12-13 PROCEDURE — 73502 X-RAY EXAM HIP UNI 2-3 VIEWS: CPT | Mod: FY

## 2019-12-13 PROCEDURE — 96127 BRIEF EMOTIONAL/BEHAV ASSMT: CPT | Performed by: FAMILY MEDICINE

## 2019-12-13 PROCEDURE — 96127 BRIEF EMOTIONAL/BEHAV ASSMT: CPT | Mod: 59 | Performed by: FAMILY MEDICINE

## 2019-12-13 PROCEDURE — 99203 OFFICE O/P NEW LOW 30 MIN: CPT | Performed by: FAMILY MEDICINE

## 2019-12-13 RX ORDER — ESCITALOPRAM OXALATE 10 MG/1
10 TABLET ORAL DAILY
Qty: 90 TABLET | Refills: 0 | Status: SHIPPED | OUTPATIENT
Start: 2019-12-13 | End: 2020-03-17

## 2019-12-13 ASSESSMENT — ANXIETY QUESTIONNAIRES
1. FEELING NERVOUS, ANXIOUS, OR ON EDGE: MORE THAN HALF THE DAYS
5. BEING SO RESTLESS THAT IT IS HARD TO SIT STILL: SEVERAL DAYS
GAD7 TOTAL SCORE: 11
7. FEELING AFRAID AS IF SOMETHING AWFUL MIGHT HAPPEN: SEVERAL DAYS
3. WORRYING TOO MUCH ABOUT DIFFERENT THINGS: NEARLY EVERY DAY
2. NOT BEING ABLE TO STOP OR CONTROL WORRYING: MORE THAN HALF THE DAYS
IF YOU CHECKED OFF ANY PROBLEMS ON THIS QUESTIONNAIRE, HOW DIFFICULT HAVE THESE PROBLEMS MADE IT FOR YOU TO DO YOUR WORK, TAKE CARE OF THINGS AT HOME, OR GET ALONG WITH OTHER PEOPLE: SOMEWHAT DIFFICULT
6. BECOMING EASILY ANNOYED OR IRRITABLE: NOT AT ALL

## 2019-12-13 ASSESSMENT — MIFFLIN-ST. JEOR: SCORE: 1412.2

## 2019-12-13 ASSESSMENT — PATIENT HEALTH QUESTIONNAIRE - PHQ9
SUM OF ALL RESPONSES TO PHQ QUESTIONS 1-9: 12
5. POOR APPETITE OR OVEREATING: MORE THAN HALF THE DAYS

## 2019-12-13 NOTE — NURSING NOTE
"Chief Complaint   Patient presents with     Anxiety   right hip pain    initial /77 (BP Location: Right arm, Cuff Size: Adult Regular)   Pulse 76   Temp 98.2  F (36.8  C) (Oral)   Resp 16   Ht 1.651 m (5' 5\")   Wt 67.1 kg (148 lb)   SpO2 96%   BMI 24.63 kg/m   Estimated body mass index is 24.63 kg/m  as calculated from the following:    Height as of this encounter: 1.651 m (5' 5\").    Weight as of this encounter: 67.1 kg (148 lb).  BP completed using cuff size: regular.   R arm      Health Maintenance that is potentially due pending provider review:  NONE    n/a    Alex Levine ma  "

## 2019-12-13 NOTE — PROGRESS NOTES
"Subjective     Analy Hastings is a 24 year old female who presents to clinic today for the following health issues:    HPI   Right hip pain for 2 weeks and anxiety  Patient started to have right hip pain. Denies any swelling, denies any skin changes. She ran 4 miles and started to have worsening pain afterward. She was not able to walk due to pain.     Also, she has been talking to a therapist x 4-6 months. It was recommended that she starts a medication for anxiety.     Patient Active Problem List   Diagnosis     CARDIOVASCULAR SCREENING; LDL GOAL LESS THAN 160     Past Surgical History:   Procedure Laterality Date     HC TOOTH EXTRACTION W/FORCEP         Social History     Tobacco Use     Smoking status: Never Smoker     Smokeless tobacco: Never Used   Substance Use Topics     Alcohol use: Yes     Alcohol/week: 0.0 standard drinks     Comment: socially     Family History   Problem Relation Age of Onset     Rheumatoid Arthritis Mother      Heart Disease Maternal Grandfather            Reviewed and updated as needed this visit by Provider         Review of Systems   ROS COMP: Constitutional, HEENT, cardiovascular, pulmonary, gi and gu systems are negative, except as otherwise noted.      Objective    /77 (BP Location: Right arm, Cuff Size: Adult Regular)   Pulse 76   Temp 98.2  F (36.8  C) (Oral)   Resp 16   Ht 1.651 m (5' 5\")   Wt 67.1 kg (148 lb)   SpO2 96%   BMI 24.63 kg/m    Body mass index is 24.63 kg/m .  Physical Exam   GENERAL: healthy, alert and no distress  RESP: lungs clear to auscultation - no rales, rhonchi or wheezes  CV: regular rate and rhythm, normal S1 S2, no S3 or S4, no murmur, click or rub, no peripheral edema and peripheral pulses strong  PSYCH: mentation appears normal, affect normal/bright  MS: full ROM of bilateral lower extremities.     Diagnostic Test Results:  Labs reviewed in Epic        Assessment & Plan       ICD-10-CM    1. Anxiety F41.9 HC BEHAV ASSMT W/SCORE & " DOCD/STAND INSTRUMENT     escitalopram (LEXAPRO) 10 MG tablet   2. Strain of right hip and thigh, initial encounter S76.011A XR Hip Right 2-3 Views    S76.911A           See Patient Instructions    Return in about 3 months (around 3/13/2020) for Follow-up visit.    Myesha Nails MD  [facility]

## 2019-12-14 ASSESSMENT — ANXIETY QUESTIONNAIRES: GAD7 TOTAL SCORE: 11

## 2019-12-18 NOTE — RESULT ENCOUNTER NOTE
Dear Analy,   Here are your recent results. No abnormalities noted in your X-ray. I would recommend physical therapy.     We hope you feel better    Best regards,     Myesha Nails MD

## 2020-03-02 ENCOUNTER — HEALTH MAINTENANCE LETTER (OUTPATIENT)
Age: 25
End: 2020-03-02

## 2020-03-17 ENCOUNTER — MYC REFILL (OUTPATIENT)
Dept: FAMILY MEDICINE | Facility: CLINIC | Age: 25
End: 2020-03-17

## 2020-03-17 DIAGNOSIS — F41.9 ANXIETY: ICD-10-CM

## 2020-03-18 RX ORDER — ESCITALOPRAM OXALATE 10 MG/1
10 TABLET ORAL DAILY
Qty: 90 TABLET | Refills: 0 | Status: SHIPPED | OUTPATIENT
Start: 2020-03-18 | End: 2020-06-19

## 2020-03-18 NOTE — TELEPHONE ENCOUNTER
"Prescription approved per [facility] Refill Protocol.  Ayesha CORDERO RN    Last Written Prescription Date:  12/13/2019  Last Fill Quantity: 90,  # refills: 0   Last office visit: 12/13/2019 with prescribing provider:     Future Office Visit:    Requested Prescriptions   Pending Prescriptions Disp Refills     escitalopram (LEXAPRO) 10 MG tablet 90 tablet 0     Sig: Take 1 tablet (10 mg) by mouth daily       SSRIs Protocol Passed - 3/17/2020  6:48 PM        Passed - Recent (12 mo) or future (30 days) visit within the authorizing provider's specialty     Patient has had an office visit with the authorizing provider or a provider within the authorizing providers department within the previous 12 mos or has a future within next 30 days. See \"Patient Info\" tab in inbasket, or \"Choose Columns\" in Meds & Orders section of the refill encounter.              Passed - Medication is active on med list        Passed - Patient is age 18 or older        Passed - No active pregnancy on record        Passed - No positive pregnancy test in last 12 months           "

## 2020-05-28 ENCOUNTER — VIRTUAL VISIT (OUTPATIENT)
Dept: FAMILY MEDICINE | Facility: CLINIC | Age: 25
End: 2020-05-28
Payer: COMMERCIAL

## 2020-05-28 DIAGNOSIS — F41.9 ANXIETY: Primary | ICD-10-CM

## 2020-05-28 PROCEDURE — 99214 OFFICE O/P EST MOD 30 MIN: CPT | Mod: GT | Performed by: PHYSICIAN ASSISTANT

## 2020-05-28 NOTE — PROGRESS NOTES
"Analy Hastings is a 24 year old female who is being evaluated via a billable video visit.      The patient has been notified of following:     \"This video visit will be conducted via a call between you and your physician/provider. We have found that certain health care needs can be provided without the need for an in-person physical exam.  This service lets us provide the care you need with a video conversation.  If a prescription is necessary we can send it directly to your pharmacy.  If lab work is needed we can place an order for that and you can then stop by our lab to have the test done at a later time.    Video visits are billed at different rates depending on your insurance coverage.  Please reach out to your insurance provider with any questions.    If during the course of the call the physician/provider feels a video visit is not appropriate, you will not be charged for this service.\"    Patient has given verbal consent for Video visit? Yes    How would you like to obtain your AVS? [facility]    Patient would like the video invitation sent by: Text to cell phone: [phone]    Will anyone else be joining your video visit? No    Subjective     Analy Hastings is a 24 year old female who presents today via video visit for the following health issues:    HPI  Medication Followup of lexapro    Taking Medication as prescribed: yes    Side Effects:  None    Medication Helping Symptoms:  NO-feels like its not helping, wants to talk about increasing dose    ARLEN 7 ON FILE FROM 5/27/2020          Video Start Time: 321 PM    Anxiety Follow-Up    How are you doing with your anxiety since your last visit? Worsened     Are you having other symptoms that might be associated with anxiety? No    Have you had a significant life event? OTHER: pandemic, work changes,     Are you feeling depressed? No    Do you have any concerns with your use of alcohol or other drugs? No    Social History     Tobacco Use     Smoking " "status: Never Smoker     Smokeless tobacco: Never Used   Substance Use Topics     Alcohol use: Yes     Alcohol/week: 0.0 standard drinks     Comment: socially     Drug use: No     ARLEN-7 SCORE 2/17/2017 12/13/2019 5/27/2020   Total Score 4 11 11     PHQ 2/17/2017 12/13/2019   PHQ-9 Total Score 2 12   Q9: Thoughts of better off dead/self-harm past 2 weeks Not at all Not at all     Anxiety has been worse due to pandemic, job changes ( doing online teaching).  Works with therapist weekly, wonders about dose increase of med.  Been on lexapro since Dec, was initially quite helpful.  Has not been helping as much lately.      BP Readings from Last 3 Encounters:   12/13/19 119/77   07/30/19 123/87   06/06/17 120/64    Wt Readings from Last 3 Encounters:   12/13/19 67.1 kg (148 lb)   07/30/19 62.1 kg (137 lb)   06/06/17 60.3 kg (133 lb)                    Reviewed and updated as needed this visit by Provider         Review of Systems   Constitutional, HEENT, cardiovascular, pulmonary, gi and gu systems are negative, except as otherwise noted.      Objective    There were no vitals taken for this visit.  Estimated body mass index is 24.63 kg/m  as calculated from the following:    Height as of 12/13/19: 1.651 m (5' 5\").    Weight as of 12/13/19: 67.1 kg (148 lb).  Physical Exam     GENERAL: Healthy, alert and no distress  EYES: Eyes grossly normal to inspection.  No discharge or erythema, or obvious scleral/conjunctival abnormalities.  RESP: No audible wheeze, cough, or visible cyanosis.  No visible retractions or increased work of breathing.    SKIN: Visible skin clear. No significant rash, abnormal pigmentation or lesions.  NEURO: Cranial nerves grossly intact.  Mentation and speech appropriate for age.  PSYCH: Mentation appears normal, affect normal/bright, judgement and insight intact, normal speech and appearance well-groomed.      Diagnostic Test Results:  Labs reviewed in Epic        Assessment & Plan "     1. Anxiety  Will increase lexapro to 15 mg.  Will have her take 1.5 of her 10 mg for 1-2 weeks.  She will send us mychart to see how that dose does, may increase to 20 if needed.             No follow-ups on file.    Silvestre Lee PA-C  [facility]      Video-Visit Details    Type of service:  Video Visit    Video End Time:3:33 PM    Originating Location (pt. Location): Home    Distant Location (provider location):  [facility]     Platform used for Video Visit: [employer]    No follow-ups on file.       Silvestre Lee PA-C

## 2020-11-02 ENCOUNTER — OFFICE VISIT (OUTPATIENT)
Dept: FAMILY MEDICINE | Facility: CLINIC | Age: 25
End: 2020-11-02
Payer: COMMERCIAL

## 2020-11-02 VITALS
HEART RATE: 58 BPM | WEIGHT: 164.7 LBS | BODY MASS INDEX: 27.44 KG/M2 | DIASTOLIC BLOOD PRESSURE: 80 MMHG | SYSTOLIC BLOOD PRESSURE: 122 MMHG | OXYGEN SATURATION: 94 % | HEIGHT: 65 IN | TEMPERATURE: 97.1 F

## 2020-11-02 DIAGNOSIS — L72.0 EPIDERMOID CYST OF FINGER: Primary | ICD-10-CM

## 2020-11-02 PROCEDURE — 99212 OFFICE O/P EST SF 10 MIN: CPT | Performed by: NURSE PRACTITIONER

## 2020-11-02 ASSESSMENT — MIFFLIN-ST. JEOR: SCORE: 1487.95

## 2020-11-02 NOTE — PROGRESS NOTES
"Subjective     Analy Hastings is a 24 year old female who presents to clinic today for the following health issues:    HPI         Concern - mass concerns  Onset: about a month  Description: patient reports noticing a mass under her skin on her left hand.  Intensity: mild, only notices anything when driving and gripping the wheel tightly  Progression of Symptoms:  same  Accompanying Signs & Symptoms: mass  Previous history of similar problem: no  Precipitating factors:        Worsened by:   Alleviating factors:        Improved by:   Therapies tried and outcome:  none     She denies pain , swelling, redness or triggering of the finger    Review of Systems   Constitutional, HEENT, cardiovascular, pulmonary, gi and gu systems are negative, except as otherwise noted.      Objective      /80 (BP Location: Left arm, Patient Position: Sitting, Cuff Size: Adult Regular)   Pulse 58   Temp 97.1  F (36.2  C) (Temporal)   Ht 1.651 m (5' 5\")   Wt 74.7 kg (164 lb 11.2 oz)   SpO2 94%   BMI 27.41 kg/m      Physical Exam   GENERAL: healthy, alert and no distress  MS: no gross musculoskeletal defects noted, no edema  SKIN: tiny 1-2 mm superficial, nontender, nonerythematous lesion at the palmar base of the 4th left finger, no limitation to ROM of finger, no triggering  NEURO: Normal strength and tone, mentation intact and speech normal  PSYCH: mentation appears normal, affect normal/bright      Assessment & Plan     (L72.0) Epidermoid cyst of finger  (primary encounter diagnosis)  Comment: this is causing no problem currently  Plan: if enlarging , triggering or causing pain RTC    Nanette Berumen, STAR CNP  [facility]    "

## 2020-12-16 DIAGNOSIS — F41.9 ANXIETY: ICD-10-CM

## 2020-12-17 RX ORDER — ESCITALOPRAM OXALATE 20 MG/1
TABLET ORAL
Qty: 90 TABLET | Refills: 0 | Status: SHIPPED | OUTPATIENT
Start: 2020-12-17 | End: 2021-05-13

## 2020-12-17 NOTE — TELEPHONE ENCOUNTER
"Requested Prescriptions   Pending Prescriptions Disp Refills     escitalopram (LEXAPRO) 20 MG tablet [Pharmacy Med Name: ESCITALOPRAM 20 MG TABLET] 90 tablet 1     Sig: TAKE 1 TABLET BY MOUTH EVERY DAY       SSRIs Protocol Passed - 12/16/2020  3:54 PM        Passed - Recent (12 mo) or future (30 days) visit within the authorizing provider's specialty     Patient has had an office visit with the authorizing provider or a provider within the authorizing providers department within the previous 12 mos or has a future within next 30 days. See \"Patient Info\" tab in inbasket, or \"Choose Columns\" in Meds & Orders section of the refill encounter.              Passed - Medication is active on med list        Passed - Patient is age 18 or older        Passed - No active pregnancy on record        Passed - No positive pregnancy test in last 12 months         Prescription approved per [facility] Refill Protocol.  "

## 2020-12-20 ENCOUNTER — HEALTH MAINTENANCE LETTER (OUTPATIENT)
Age: 25
End: 2020-12-20

## 2021-05-13 ENCOUNTER — MYC MEDICAL ADVICE (OUTPATIENT)
Dept: FAMILY MEDICINE | Facility: CLINIC | Age: 26
End: 2021-05-13

## 2021-05-14 ENCOUNTER — MYC REFILL (OUTPATIENT)
Dept: FAMILY MEDICINE | Facility: CLINIC | Age: 26
End: 2021-05-14

## 2021-05-14 DIAGNOSIS — F41.9 ANXIETY: ICD-10-CM

## 2021-05-14 RX ORDER — ESCITALOPRAM OXALATE 20 MG/1
20 TABLET ORAL DAILY
Qty: 30 TABLET | Refills: 0 | Status: CANCELLED | OUTPATIENT
Start: 2021-05-14

## 2021-07-06 DIAGNOSIS — F41.9 ANXIETY: ICD-10-CM

## 2021-07-06 RX ORDER — ESCITALOPRAM OXALATE 20 MG/1
TABLET ORAL
Qty: 30 TABLET | Refills: 0 | Status: SHIPPED | OUTPATIENT
Start: 2021-07-06 | End: 2021-07-09

## 2021-07-06 NOTE — TELEPHONE ENCOUNTER
Medication is being filled for 1 time refill only due to:  Patient needs to be seen because over a year since last seen.    Sent "[employer]t reminder, due for physical.    Clarissa Parker RN

## 2021-07-08 ASSESSMENT — ENCOUNTER SYMPTOMS
WEAKNESS: 0
HEMATOCHEZIA: 0
CHILLS: 0
DIARRHEA: 0
FEVER: 0
SHORTNESS OF BREATH: 0
SORE THROAT: 0
DYSURIA: 0
ABDOMINAL PAIN: 0
PARESTHESIAS: 0
EYE PAIN: 0
HEADACHES: 0
NERVOUS/ANXIOUS: 0
MYALGIAS: 0
NAUSEA: 0
HEMATURIA: 0
DIZZINESS: 0
FREQUENCY: 0
BREAST MASS: 0
CONSTIPATION: 0
COUGH: 0
JOINT SWELLING: 0
ARTHRALGIAS: 0
PALPITATIONS: 0
HEARTBURN: 0

## 2021-07-09 ENCOUNTER — OFFICE VISIT (OUTPATIENT)
Dept: FAMILY MEDICINE | Facility: CLINIC | Age: 26
End: 2021-07-09
Payer: COMMERCIAL

## 2021-07-09 VITALS
TEMPERATURE: 98 F | HEIGHT: 65 IN | WEIGHT: 161 LBS | OXYGEN SATURATION: 98 % | BODY MASS INDEX: 26.82 KG/M2 | HEART RATE: 86 BPM | DIASTOLIC BLOOD PRESSURE: 74 MMHG | SYSTOLIC BLOOD PRESSURE: 109 MMHG

## 2021-07-09 DIAGNOSIS — Z11.59 NEED FOR HEPATITIS C SCREENING TEST: ICD-10-CM

## 2021-07-09 DIAGNOSIS — F41.9 ANXIETY: ICD-10-CM

## 2021-07-09 DIAGNOSIS — Z82.61 FAMILY HISTORY OF RHEUMATOID ARTHRITIS: ICD-10-CM

## 2021-07-09 DIAGNOSIS — Z79.899 MEDICATION MANAGEMENT: ICD-10-CM

## 2021-07-09 DIAGNOSIS — Z13.29 SCREENING FOR THYROID DISORDER: ICD-10-CM

## 2021-07-09 DIAGNOSIS — Z00.00 ROUTINE GENERAL MEDICAL EXAMINATION AT A HEALTH CARE FACILITY: Primary | ICD-10-CM

## 2021-07-09 DIAGNOSIS — Z13.220 SCREENING FOR LIPID DISORDERS: ICD-10-CM

## 2021-07-09 DIAGNOSIS — Z13.0 SCREENING FOR DEFICIENCY ANEMIA: ICD-10-CM

## 2021-07-09 LAB
ANION GAP SERPL CALCULATED.3IONS-SCNC: 8 MMOL/L (ref 3–14)
BUN SERPL-MCNC: 9 MG/DL (ref 7–30)
CALCIUM SERPL-MCNC: 9.4 MG/DL (ref 8.5–10.1)
CHLORIDE SERPL-SCNC: 110 MMOL/L (ref 94–109)
CHOLEST SERPL-MCNC: 158 MG/DL
CO2 SERPL-SCNC: 24 MMOL/L (ref 20–32)
CREAT SERPL-MCNC: 0.84 MG/DL (ref 0.52–1.04)
ERYTHROCYTE [DISTWIDTH] IN BLOOD BY AUTOMATED COUNT: 12.9 % (ref 10–15)
GFR SERPL CREATININE-BSD FRML MDRD: >90 ML/MIN/{1.73_M2}
GLUCOSE SERPL-MCNC: 86 MG/DL (ref 70–99)
HCT VFR BLD AUTO: 40.4 % (ref 35–47)
HCV AB SERPL QL IA: NONREACTIVE
HDLC SERPL-MCNC: 49 MG/DL
HGB BLD-MCNC: 14.1 G/DL (ref 11.7–15.7)
LDLC SERPL CALC-MCNC: 81 MG/DL
MCH RBC QN AUTO: 30.5 PG (ref 26.5–33)
MCHC RBC AUTO-ENTMCNC: 34.9 G/DL (ref 31.5–36.5)
MCV RBC AUTO: 87 FL (ref 78–100)
NONHDLC SERPL-MCNC: 109 MG/DL
PLATELET # BLD AUTO: 369 10E9/L (ref 150–450)
POTASSIUM SERPL-SCNC: 4.2 MMOL/L (ref 3.4–5.3)
RBC # BLD AUTO: 4.62 10E12/L (ref 3.8–5.2)
SODIUM SERPL-SCNC: 142 MMOL/L (ref 133–144)
TRIGL SERPL-MCNC: 139 MG/DL
TSH SERPL DL<=0.005 MIU/L-ACNC: 0.5 MU/L (ref 0.4–4)
WBC # BLD AUTO: 7 10E9/L (ref 4–11)

## 2021-07-09 PROCEDURE — 84443 ASSAY THYROID STIM HORMONE: CPT | Performed by: INTERNAL MEDICINE

## 2021-07-09 PROCEDURE — 80048 BASIC METABOLIC PNL TOTAL CA: CPT | Performed by: INTERNAL MEDICINE

## 2021-07-09 PROCEDURE — 85027 COMPLETE CBC AUTOMATED: CPT | Performed by: INTERNAL MEDICINE

## 2021-07-09 PROCEDURE — 36415 COLL VENOUS BLD VENIPUNCTURE: CPT | Performed by: INTERNAL MEDICINE

## 2021-07-09 PROCEDURE — 99395 PREV VISIT EST AGE 18-39: CPT | Performed by: INTERNAL MEDICINE

## 2021-07-09 PROCEDURE — 86803 HEPATITIS C AB TEST: CPT | Performed by: INTERNAL MEDICINE

## 2021-07-09 PROCEDURE — 99213 OFFICE O/P EST LOW 20 MIN: CPT | Mod: 25 | Performed by: INTERNAL MEDICINE

## 2021-07-09 PROCEDURE — 80061 LIPID PANEL: CPT | Performed by: INTERNAL MEDICINE

## 2021-07-09 RX ORDER — ESCITALOPRAM OXALATE 20 MG/1
20 TABLET ORAL DAILY
Qty: 90 TABLET | Refills: 3 | Status: SHIPPED | OUTPATIENT
Start: 2021-07-09 | End: 2021-10-19

## 2021-07-09 ASSESSMENT — MIFFLIN-ST. JEOR: SCORE: 1466.17

## 2021-07-09 NOTE — PATIENT INSTRUCTIONS
Labs today  Follow up in one year for physical   Seek sooner medical attention if there is any worsening of symptoms or problems.       Preventive Health Recommendations  Female Ages 26 - 39  Yearly exam:   See your health care provider every year in order to    Review health changes.     Discuss preventive care.      Review your medicines if you your doctor has prescribed any.    Until age 30: Get a Pap test every three years (more often if you have had an abnormal result).    After age 30: Talk to your doctor about whether you should have a Pap test every 3 years or have a Pap test with HPV screening every 5 years.   You do not need a Pap test if your uterus was removed (hysterectomy) and you have not had cancer.  You should be tested each year for STDs (sexually transmitted diseases), if you're at risk.   Talk to your provider about how often to have your cholesterol checked.  If you are at risk for diabetes, you should have a diabetes test (fasting glucose).  Shots: Get a flu shot each year. Get a tetanus shot every 10 years.   Nutrition:     Eat at least 5 servings of fruits and vegetables each day.    Eat whole-grain bread, whole-wheat pasta and brown rice instead of white grains and rice.    Get adequate Calcium and Vitamin D.     Lifestyle    Exercise at least 150 minutes a week (30 minutes a day, 5 days of the week). This will help you control your weight and prevent disease.    Limit alcohol to one drink per day.    No smoking.     Wear sunscreen to prevent skin cancer.    See your dentist every six months for an exam and cleaning.

## 2021-07-09 NOTE — PROGRESS NOTES
SUBJECTIVE:   CC: Analy Hastings is an 25 year old woman who presents for preventive health visit.       Patient has been advised of split billing requirements and indicates understanding: Yes  Healthy Habits:     Getting at least 3 servings of Calcium per day:  Yes    Bi-annual eye exam:  NO    Dental care twice a year:  NO    Sleep apnea or symptoms of sleep apnea:  None    Diet:  Vegetarian/vegan    Frequency of exercise:  4-5 days/week    Duration of exercise:  30-45 minutes    Taking medications regularly:  Yes    Medication side effects:  None    PHQ-2 Total Score: 0    Additional concerns today:  No    Taking 20 mg of Lexapro to control anxiety  The dose was slowly increased to control her anxiety  It works   Sees therapist  [facility] [place] system  Has been taking for 2 years  She is    Due to pandemic her anxiety got worse        Today's PHQ-2 Score:   PHQ-2 ( 1999 [employer]) 7/8/2021   Q1: Little interest or pleasure in doing things 0   Q2: Feeling down, depressed or hopeless 0   PHQ-2 Score 0   Q1: Little interest or pleasure in doing things Not at all   Q2: Feeling down, depressed or hopeless Not at all   PHQ-2 Score 0       Abuse: Current or Past (Physical, Sexual or Emotional) - No  Do you feel safe in your environment? Yes    Have you ever done Advance Care Planning? (For example, a Health Directive, POLST, or a discussion with a medical provider or your loved ones about your wishes): Yes, advance care planning is on file.    Social History     Tobacco Use     Smoking status: Never Smoker     Smokeless tobacco: Never Used   Substance Use Topics     Alcohol use: Yes     Alcohol/week: 0.0 standard drinks     Comment: socially     If you drink alcohol do you typically have >3 drinks per day or >7 drinks per week? No    Alcohol Use 7/9/2021   Prescreen: >3 drinks/day or >7 drinks/week? -   Prescreen: >3 drinks/day or >7 drinks/week? No       Reviewed orders with patient.   "Reviewed health maintenance and updated orders accordingly - Yes  Lab work is in process    Breast Cancer Screening:    Breast CA Risk Assessment (FHS-7) 7/8/2021   Do you have a family history of breast, colon, or ovarian cancer? No / Unknown           Pertinent mammograms are reviewed under the imaging tab.    History of abnormal Pap smear: NO - age 21-29 PAP every 3 years recommended  PAP / HPV 7/30/2019 9/23/2015   PAP NIL NIL     Reviewed and updated as needed this visit by clinical staff  Tobacco   Meds   Med Hx            Reviewed and updated as needed this visit by Provider                    Review of Systems  CONSTITUTIONAL: NEGATIVE for fever, chills, change in weight  INTEGUMENTARU/SKIN: NEGATIVE for worrisome rashes, moles or lesions  EYES: NEGATIVE for vision changes or irritation  ENT: NEGATIVE for ear, mouth and throat problems  RESP: NEGATIVE for significant cough or SOB  BREAST: NEGATIVE for masses, tenderness or discharge  CV: NEGATIVE for chest pain, palpitations or peripheral edema  GI: NEGATIVE for nausea, abdominal pain, heartburn, or change in bowel habits  : NEGATIVE for unusual urinary or vaginal symptoms.she has mirena IUD  Some spotting here and there   MUSCULOSKELETAL: NEGATIVE for significant arthralgias or myalgia  NEURO: NEGATIVE for weakness, dizziness or paresthesias  PSYCHIATRIC: NEGATIVE for changes in mood or affect  lexapro is working for her anxiety  OBJECTIVE:   /74 (BP Location: Right arm, Patient Position: Chair, Cuff Size: Adult Large)   Pulse 86   Temp 98  F (36.7  C) (Oral)   Ht 1.651 m (5' 5\")   Wt 73 kg (161 lb)   SpO2 98%   Breastfeeding No   BMI 26.79 kg/m    Physical Exam  GENERAL: healthy, alert and no distress  EYES: Eyes grossly normal to inspection, PERRL and conjunctivae and sclerae normal  HENT: ear canals and TM's normal, nose and mouth without ulcers or lesions  NECK: no adenopathy, no asymmetry, masses, or scars and thyroid normal to " palpation  RESP: lungs clear to auscultation - no rales, rhonchi or wheezes  BREAST: normal without masses, tenderness or nipple discharge and no palpable axillary masses or adenopathy  Left breast is larger than right   CV: regular rate and rhythm, normal S1 S2, no S3 or S4, no murmur, click or rub, no peripheral edema and peripheral pulses strong  ABDOMEN: soft, nontender, no hepatosplenomegaly, no masses and bowel sounds normal  MS: no gross musculoskeletal defects noted, no edema  Bunion on both side  SKIN: no suspicious lesions or rashes  Callus on medi side of big toe  Pimples on back  NEURO: Normal strength and tone, mentation intact and speech normal  PSYCH: mentation appears normal, affect normal/bright        ASSESSMENT/PLAN:   Analy was seen today for physical.    Diagnoses and all orders for this visit:    Routine general medical examination at a health care facility  Preventive health counseling was also done.  She had Pap smear done on July 30, 2019  No concerns about any STD screening  She has Mirena IUD which will be due for removal next year    Anxiety  -     escitalopram (LEXAPRO) 20 MG tablet; Take 1 tablet (20 mg) by mouth daily for anxiety  Educated her about this medication  Higher dose is working well for her  Discussed the side effects of weight gain and effects on sex drive  Discussed the importance of healthy diet and exercise  She has gained weight with this medication  She will work on healthy diet and exercise which is good for physical and mental health both    Screening for thyroid disorder  -     TSH with free T4 reflex    Screening for deficiency anemia  -     CBC with platelets    Medication management  -     Basic metabolic panel    Need for hepatitis C screening test  -     Hepatitis C Screen Reflex to HCV RNA Quant and Genotype    Screening for lipid disorders  -     Lipid panel reflex to direct LDL Non-fasting    Family history of rheumatoid arthritis  Comments:  mother  She  "is asymptomatic      Patient has been advised of split billing requirements and indicates understanding: Yes  COUNSELING:  Reviewed preventive health counseling, as reflected in patient instructions       Regular exercise       Healthy diet/nutrition    Estimated body mass index is 26.79 kg/m  as calculated from the following:    Height as of this encounter: 1.651 m (5' 5\").    Weight as of this encounter: 73 kg (161 lb).    Weight management plan: Discussed healthy diet and exercise guidelines    She reports that she has never smoked. She has never used smokeless tobacco.    Disclaimer: This note consists of symbols derived from keyboarding, dictation and/or voice recognition software. As a result, there may be errors in the script that have gone undetected. Please consider this when interpreting information found in this chart.  Counseling Resources:  ATP IV Guidelines  Pooled Cohorts Equation Calculator  Breast Cancer Risk Calculator  BRCA-Related Cancer Risk Assessment: FHS-7 Tool  FRAX Risk Assessment  ICSI Preventive Guidelines  Dietary Guidelines for Americans, 2010  USDA's MyPlate  ASA Prophylaxis  Lung CA Screening    Gay Anne MD  [facility]  "

## 2021-07-09 NOTE — RESULT ENCOUNTER NOTE
Toby Beckford,    This is to inform you regarding your test result.    CBC result which includes white count Hemoglobin and  Platelet Counts is normal.   Other test results are pending.      Sincerely,      Dr.Nasima Dayana MD,FACP

## 2021-07-10 NOTE — RESULT ENCOUNTER NOTE
Toby Beckford,    This is to inform you regarding your test result.    Your total cholesterol is normal.  HDL which is called good cholesterol is low.  Your LDL cholesterol is normal.  This is often call bad cholesterol and high levels increase the risk for heart attacks and strokes.  Your triglycerides are normal.  TSH which is thyroid hormone is normal.  Basic metabolic panel which includes electrolytes and kidney fucntion is satisfactory   Hepatitis C Antibody is negative.  CBC result which includes white count Hemoglobin and  Platelet Counts is normal.       Sincerely,      Dr.Nasima Dayana MD,FACP

## 2021-10-03 ENCOUNTER — HEALTH MAINTENANCE LETTER (OUTPATIENT)
Age: 26
End: 2021-10-03

## 2021-10-19 DIAGNOSIS — F41.9 ANXIETY: ICD-10-CM

## 2021-10-19 RX ORDER — ESCITALOPRAM OXALATE 20 MG/1
20 TABLET ORAL DAILY
Qty: 90 TABLET | Refills: 2 | Status: SHIPPED | OUTPATIENT
Start: 2021-10-19

## 2021-10-19 NOTE — TELEPHONE ENCOUNTER
Pharmacy change  Prescription approved per [facility] Refill Protocol.  Ayesha CORDERO RN

## 2021-10-19 NOTE — TELEPHONE ENCOUNTER
Change to mail order pharmacy, new Rx needed    LOV 7-9-2021 ELIE Anne is Jaqui  Follow up July 2022 with PCP    RT Jc (R)

## 2022-09-10 ENCOUNTER — HEALTH MAINTENANCE LETTER (OUTPATIENT)
Age: 27
End: 2022-09-10

## 2023-07-23 ENCOUNTER — HEALTH MAINTENANCE LETTER (OUTPATIENT)
Age: 28
End: 2023-07-23